# Patient Record
(demographics unavailable — no encounter records)

---

## 2018-03-08 NOTE — PDOC.LDHP
Labor and Delivery H&P


Chief complaint: abdominal pain


HPI: 





30 yo G1 at 39.2 by 7.2w US here with complaint of abdominal pain which onset 

this morning. She denies associated loss of fluid, vaginal bleeding, or 

decreased fetal movement. Her pregnancy course has been uncomplicated to this 

point. She has no significant medical history. She takes only PNV and iron. 


Current gestational age (weeks): 39 (39.2)


Due date: 18


Dating criteria: first trimester ultrasound (7.2w)


Grav: 1


Current pregnancy complications: none


Abnormal US findings: No


Current medications: pre- vitamins, iron


Social history: none





- Physical Exam


Vital signs reviewed and normal: yes


General: NAD, resting


Heart: other (Irregular rhythm with 3/6 systolic murmur heard best over mitral 

valve)


Lungs: CTAB


Abdomen: gravid


Extremeties: no edema


FHT: category 1 (Baseline 140), variability present


[place] contractions every: 6-7





- Vaginal Exam


cm dilated: 1


Effacement: 50%


Station: -3





- OB Labs


Blood type: O


RH: positive


Antibody Screen: negative


HIV: negative


RPR: negative


HEPSAg: negative


1 hour GCT: negative


GBS: negative


Rubella: immune





- Assessment





Term pregnancy, membranes intact, inconsistent contractions





- Plan


Plan: observation in L&D


-: 





Pt does not appear to be in labor. FHT are reassuring. Will monitor pt on L&D 

and recheck in 2 hours. Plan to discharge if she does not make significant 

change at recheck with instructions to return if she contracts q5 minutes for 

at least an hour, loses fluid or blood per vagina, or has decreased fetal 

movement. 





<João Marcos - Last Filed: 18 22:56>





<Anne Loo - Last Filed: 18 00:59>


Allergies/Adverse Reactions: 


 Allergies











Allergy/AdvReac Type Severity Reaction Status Date / Time


 


No Known Allergies Allergy   Verified 18 22:50














Attending Addendum





- Attending Addendum


Date/Time: 18 0056





I personally evaluated the patient and discussed the management with Dr. Marcos


I agree with the History, Examination, Assessment and Plan documented above 

with any addition or exceptions noted below- 30 yo  @39 2/7 weeks 

presented c/o ctx. Denies any VB, LOF (+)FM. Denies HA, visual change. Afebrile 

VSS SVE 1/50%/-2. Category 1 FHTs. [place] ctx q6-7 min.  Patient sent to ambulate 

for 2 hours. If no cervical change, will d/c home with labor precautions. 








<Anne Loo - Last Filed: 18 00:59>

## 2018-03-10 NOTE — PDOC.LDHP
Labor and Delivery H&P


Chief complaint: contractions


HPI: 





28 yo G1 at 39.4 by 7.2 week sono here with complaint of back pain associated 

with contractions. The pain and contractions have been present for about 2 

days. She was seen here on 3/8 for similar symptoms. She states that the pain 

now is worse than before, however the frequency of the contractions is about 

the same. She denies loss of fluid or blood per vagina. She denies decreased 

fetal movement. Her pregnancy has been uncomplicated thus far. She is 

requesting medicine for pain control.


Current gestational age (weeks): 39 (39.4)


Due date: 18


Dating criteria: first trimester ultrasound


Grav: 1


Current pregnancy complications: none


Abnormal US findings: No


Current medications: pre-sophie vitamins


Allergies/Adverse Reactions: 


 Allergies











Allergy/AdvReac Type Severity Reaction Status Date / Time


 


No Known Allergies Allergy   Verified 03/10/18 04:32











Social history: none





- Physical Exam


Vital signs reviewed and normal: yes


General: breathing through contractions


Heart: RRR (Systolic murmur, unchanged from previous exam on 3/8)


Lungs: CTAB


Abdomen: gravid (Non tender)


Extremeties: no edema


FHT: category 1, variability present, absent or minimal variables


[place] contractions every: 8





- Vaginal Exam


cm dilated: 1


Effacement: 25%


Station: -3





- OB Labs


Blood type: O


RH: positive


Antibody Screen: negative


HIV: negative


RPR: negative


HEPSAg: negative


1 hour GCT: negative


GBS: negative


Urine drug screen: negative


Rubella: immune





- Assessment





Term pregnancy in latent labor





- Plan


Plan: observation in L&D


-: 





Observe in L&D and give meds for pain control. Will also discuss other methods 

of pain control such as hot bath and resting on all fours as this may be 

related to fetal positioning. Recheck in 2 hours, send home if pain is 

controlled at that time.

## 2018-03-11 NOTE — PDOC.LDPN
Labor & Delivery Progress Note





- Subjective


Subjective: comfortable (s/p epidural)





- Objective


Vital signs reviewed and normal: yes


General: NAD, resting


SVE: per RN: 5.5/100/-1


FHT: category 1, variability present


[place] contractions every: 4-6 min


AROM: clear fluid





- Assessment


(1) Term pregnancy


Code(s): Z34.80 - ENCOUNTER FOR SUPRVSN OF NORMAL PREGNANCY, UNSP TRIMESTER   

Current Visit: Yes   Status: Acute   





(2) Active labor


Code(s): UOG8446 -    Current Visit: Yes   Status: Acute   


Plan: continue plan of care

## 2018-03-11 NOTE — PDOC.LDPN
Labor & Delivery Progress Note





- Subjective


Subjective: comfortable





- Objective


Abnormal vital signs: Temp to 100.0, went down to 99 without any intervention, 

Normal BP


General: NAD


Uterine fundus: non tender


SVE: 7/100/0 by Dr. Luque


Dilation: 7


Effacement: 100%


Station: 0


FHT: category 1, early decelerations, variability present


[place] contractions every: 5-6 minutes


Other exam findings: Fetal head position LOT


Procedures: IUPC placed


IUPC placed: yes


Resuscitative measures: maternal position change





- Assessment


(1) Active labor


Code(s): TIA6777 -    Current Visit: Yes   Status: Acute   





(2) Term pregnancy


Code(s): Z34.80 - ENCOUNTER FOR SUPRVSN OF NORMAL PREGNANCY, UNSP TRIMESTER   

Current Visit: Yes   Status: Acute   


Plan: continue plan of care, pitocin for augmentation


-: 


G1 @ 39.5 WGA by 7.2 wk US here in active labor


SVE 7/100/0


Cat 1 FHT


Epidural in place for pain control


-IUPC placed, inadequate contractions at this time


-Pit for labor augmentation


-Continue to monitor closely

## 2018-03-11 NOTE — PDOC.LDPN
Labor & Delivery Progress Note





- Subjective


Subjective: comfortable





- Objective


Vital signs reviewed and normal: yes


General: NAD


Uterine fundus: non tender


SVE: @ 1830 by Dr. Trujillo


Dilation: 9


Effacement: 100%


Station: 0


FHT: category 1, early decelerations, variability present


[place] contractions every: 4-5 minutes





- Assessment


(1) Active labor


Code(s): WLS9179 -    Current Visit: Yes   Status: Acute   





(2) Term pregnancy


Code(s): Z34.80 - ENCOUNTER FOR SUPRVSN OF NORMAL PREGNANCY, UNSP TRIMESTER   

Current Visit: Yes   Status: Acute   


Plan: continue plan of care


-: 


G1 @ 39.5 WGA by 7.2 wk US here in active labor


SVE 9/100/0


Cat 1 FHT


Epidural in place for pain control


-Continue routine care








<Deandra Trujillo - Last Filed: 03/11/18 18:42>





Attending Addendum





- Attending Addendum


Date/Time: 03/11/18 2027





I personally evaluated the patient and discussed the management with Dr. Trujillo. 


I agree with the History, Examination, Assessment and Plan documented above 

with any addition or exceptions noted below.





Dr. Luque's continuity, who will be assuming care.  I am in house if necessary.








<Collin Simpson - Last Filed: 03/11/18 20:27>

## 2018-03-11 NOTE — PDOC.LDPN
Labor & Delivery Progress Note





- Subjective


Subjective: comfortable, no concerns





- Objective


Vital signs reviewed and normal: yes


General: NAD, resting


Uterine fundus: non tender


SVE: 7/100/0 per RN


FHT: category 1, early decelerations, variability present


[place] contractions every: 2-5 min


FSE placed: yes (per RN)





- Assessment


(1) Term pregnancy


Code(s): Z34.80 - ENCOUNTER FOR SUPRVSN OF NORMAL PREGNANCY, UNSP TRIMESTER   

Current Visit: Yes   Status: Acute   





(2) Active labor


Code(s): GZU3277 -    Current Visit: Yes   Status: Acute   


Plan: continue plan of care

## 2018-03-11 NOTE — PDOC.LDHP
Labor and Delivery H&P


Chief complaint: contractions, loss of fluid (approx 3 am)


HPI: 


28yo G1 at 39.5wk by 7.2wk US who presents after LOF (trickling) which began at 

approx 3 am today. She also has been c/o regular ctx since 2 days PTA, but they 

have increased in strength, intensity and frequency after the LOF. 


No VB. Feeling nml FM. 


Current gestational age (weeks): 39 (39.5)


Due date: 18


Dating criteria: first trimester ultrasound (7.2wk)


Grav: 1


Para: 0


OB History Details: 


BV- treated


Current pregnancy complications: none


Abnormal US findings: No


Past Medical History: 


none


Current medications: pre-sophie vitamins


Previous surgical history: none


Social history: none





- Physical Exam


Vital signs reviewed and normal: yes


General: NAD, resting, breathing through contractions (painful contractions)


Heart: RRR


Lungs: CTAB


Abdomen: gravid


Extremeties: no edema


FHT: category 1


[place] contractions every: 4-6 minutes, strong 120-160sec contractions





- Vaginal Exam


cm dilated: 3 (3.5 per RN)


Effacement: 90%


Station: -1





- OB Labs


Blood type: O


RH: positive


Antibody Screen: negative


HIV: negative


RPR: negative


HEPSAg: negative


1 hour GCT: negative


GBS: negative


Urine drug screen: not done


Rubella: immune


Additional Labs: 


Amnisure (+)





- Assessment


L&D Assessment: term rupture in membranes





- Plan


Plan: admit to L&D, labor augmentation if indicated, informed consent obtained, 

anesthesia consult for pain management





<Deandra Mello - Last Filed: 18 10:38>





<Kimberlyn Baker - Last Filed: 18 10:54>


Allergies/Adverse Reactions: 


 Allergies











Allergy/AdvReac Type Severity Reaction Status Date / Time


 


No Known Allergies Allergy   Verified 03/10/18 04:32














Attending Addendum





- Attending Addendum


Date/Time: 18 1053





I personally evaluated the patient and discussed the management with Dr. Mello 

on 3/11/18.


I agree with the History, Examination, Assessment and Plan documented above 

with any addition or exceptions noted below.


Patient is a G1 at 39.5 with SROM and spontaneous labor.  Getting her epidural 

now.  GBS negative.  Routine, uncomplicated pregnancy.  Expectant management.





<Kimberlyn Baker - Last Filed: 18 10:54>

## 2018-03-11 NOTE — PDOC.LDPN
Labor & Delivery Progress Note





- Subjective


Subjective: comfortable





- Objective


Vital signs reviewed and normal: yes


General: NAD, resting


Uterine fundus: non tender


SVE: 9/100/0, FSE removed (not working)


FHT: category 1, category 2 (cat 1-2 (alternating with mod to min variability) )

, early decelerations


[place] contractions every: 2-5 min


Other exam findings: FSE bent in place, removed and external toco restarted





- Assessment


(1) Term pregnancy


Code(s): Z34.80 - ENCOUNTER FOR SUPRVSN OF NORMAL PREGNANCY, UNSP TRIMESTER   

Current Visit: Yes   Status: Acute   





(2) Active labor


Code(s): UFP9448 -    Current Visit: Yes   Status: Acute   


Plan: continue plan of care

## 2018-03-12 NOTE — PDOC.OPDEL
OB Operative/Delivery Note


Delivery Dr/Surgeon: Deandra Trujillo MD, Glen Luque MD, Marilu Klein DO


Pre-Delivery Diagnosis: active labor, ruptured membrane


Procedure/Post Delivery Dx: operative vaginal delivery


Weeks gestation: 39 (6 days)


Anesthesia: epidural (also had a pudendal block prior to vacuum delivery and 

local lidocaine for laceration repair)





- Findings


  ** A


Sex: female (viable)


Apgar - 1 min: 8


Apgar - 5 min: 9





- Additional Findings/Plan


Placenta delivered: spontaneous (intact, 3 vessel cord noted)


Repaired Obstetrical Laceration: 2nd degree (midline episiotomy was cut during 

vacuum assisted delivery and the 2nd degree lac was repaired with 3-0 vicryl)


Estimated blood loss: 250 mL


Compilations/Other Findings: 


vacuum assisted delivery at 0537 on 3/12 due to prolonged second stage of 

labor. 1 involuntary release of the vacuum, 2 minutes of application time, 

delivery achieved during one contraction, an episiotomy was cut during the 

delivery of the vacuum. 


After the jaw was in view the vacuum was released and the anterior shoulder and 

then the rest of the  was delivered in the routine fashion. There was no 

nuchal cord. The mouth and nares were bulb suctioned. 


Post delivery plan: routine recovery

## 2018-03-12 NOTE — PDOC.LDPN
Labor & Delivery Progress Note





- Subjective


Subjective: painful contractions, vaginal pressure





- Objective


Vital signs reviewed and normal: yes


General: breathing through contractions


Uterine fundus: palpable contractions


SVE: @ 0205 by Dr. Trujillo


Dilation: anterior lip


Effacement: 100%


Station: 1+


FHT: category 2, late decelerations (4 late decels that resolved with position 

change), variability present


[place] contractions every: 2-5 minutes





- Assessment


(1) Active labor


Code(s): [id number] -    Current Visit: Yes   Status: Acute   





(2) Term pregnancy


Code(s): Z34.80 - ENCOUNTER FOR SUPRVSN OF NORMAL PREGNANCY, UNSP TRIMESTER   

Current Visit: Yes   Status: Acute   


Plan: continue plan of care, pitocin for augmentation


-: 


G1 @ 39.6 WGA by 7.2 wk US here in active labor


SVE 9.5/100/+1


Cat 1 FHT


Epidural in place for pain control


IUPC in place, ctx have been adequate for the past 2.5 hours


-Pit for labor augmentation, currently at 6


-Continue to monitor closely


-Will recheck in 30 minutes-1 hour


-maternal position changes for late decels

## 2018-03-12 NOTE — PDOC.LDPN
Labor & Delivery Progress Note





- Subjective


Subjective: painful contractions





- Objective


Vital signs reviewed and normal: yes


General: NAD


Uterine fundus: non tender


SVE: @ 0030 by nurse


Dilation: 8


Effacement: 100%


Station: 1+


FHT: category 1, early decelerations, variability present


[place] contractions every: 2-3 minutes, adequate ctx for 1 hour on pit at 6





- Assessment


(1) Active labor


Code(s): BIG8130 -    Current Visit: Yes   Status: Acute   





(2) Term pregnancy


Code(s): Z34.80 - ENCOUNTER FOR SUPRVSN OF NORMAL PREGNANCY, UNSP TRIMESTER   

Current Visit: Yes   Status: Acute   


Plan: continue plan of care, pitocin for augmentation


-: 


G1 @ 39.6 WGA by 7.2 wk US here in active labor


SVE 8/100/+1


Cat 1 FHT


Epidural in place for pain control


IUPC in place, ctx have been adequate for the past hour


-Pit for labor augmentation


-Continue to monitor closely


-Will recheck in 2 hours

## 2018-03-13 NOTE — PDOC.PP
Post Partum Progress Note


Post Partum Day #: 1


Subjective: 





Pt doing well this morning. Denies any headaches or dizziness. Denies any fever/

chills. Denies any SOB. Denies any acute events overnight. Pt does report 

having some pain in her ankles and feet bilaterally. No redness or swelling 

noted. 


PO intake tolerated: yes


Flatus: yes


Ambulation: yes (She has not been up walking much per reports of nurse)


 Vital Signs (12 hours)











  Temp Pulse Resp BP BP Pulse Ox


 


 18 05:15  97.6 F  71  20  98/56 L  


 


 18 00:00  98.4 F  72  18  92/54 L  


 


 18 19:45  98.6 F  86  18   113/50 L  98








 Weight











Weight                         59.874 kg

















- Physical Examination


General: NAD


Cardiovascular: no m/r/g, RRR


Respiratory: clear to auscultation bilaterally, non-labored breathing


Abdominal: + bowel sounds, lochia, no distention, appropriately TTP


Fundus firm & at: umbilicus


Deviation from normal: homans positive, no redness or swelling noted


Skin: no rash


Neurological: no gross focal deficits


Psychiatric: A&Ox3, normal affect


Result Diagrams: 


 18 11:52





Additional Labs: 


 Post Partum Labs











Hep Bs Antigen  Non-Reactive S/CO (NonReactive)   18  10:17    











(1) Term pregnancy delivered


Code(s): O80 - ENCOUNTER FOR FULL-TERM UNCOMPLICATED DELIVERY   Status: Acute   





(2) Premature rupture of membranes


Code(s): O42.90 - JUAN ROM, 7TH0 BETW RUPT & ONST LABR, UNSP WEEKS OF GEST   

Status: Acute   





- Assessment/Plan





30 yo  delivered a viable TAGA Female infant via vacuum assisted vaginal 

delivery. 2nd degree lac was noted and repaired in usual fashion. Mom had 

premature rupture of membranes and time from rupture to delivery was 26 hours. 


-Routine Post-partum care 


-Pt doing well. Advise to ambulate often. 


-Pumping at this time.


-No sign of fevers or infection 


-Pt dizzy and having headaches yesterday. Vital signs stable and repeat post 

hemagram did not show significant drop. Advised mom to drink plenty of fluids. 

If continues to get dizzy may bolus with fluid and start iron. 





Navarro's sign positive. Having bilateral ankle and feet pain. No swelling or 

redness noted. No increase in heat. 


-Pt has not been as ambulatory per nursing. Advised pt to get up and move 

around. 


-Will continue to monitor. If gets red or worse unilaterally may consider U/S











<Amanuel Estrada - Last Filed: 18 10:00>


 Vital Signs (12 hours)











  Temp Pulse Resp BP


 


 18 07:50  98.1 F  71  16  98/55 L


 


 18 05:15  97.6 F  71  20  98/56 L


 


 18 00:00  98.4 F  72  18  92/54 L








 Weight











Weight                         59.874 kg














Result Diagrams: 


 18 11:52





Additional Labs: 


 Post Partum Labs











Hep Bs Antigen  Non-Reactive S/CO (NonReactive)   18  10:17    














<Barbara Wood - Last Filed: 18 11:56>





Attending Addendum





- Attending Addendum


Date/Time: 18 1152





I personally evaluated the patient and discussed the management with Rupal Estrada and Riaz. 


I agree with the History, Examination, Assessment and Plan documented above 

with any addition or exceptions noted below.


PPD # 1 s/p VAVD


Doing well. 2+ankle edema without calf tenderness, redness or warmth. Negative 

Navarro's sign on my exam. 


Continue routine postpartum care








<Barbara Wood - Last Filed: 18 11:56>

## 2018-03-14 NOTE — PDOC.PP
Post Partum Progress Note


Post Partum Day #: 2


Subjective: 





Pt doing well. Reports having runny nose and sore throat this morning. Reports 

foot and ankle pain doing better. Reports having light lochia. Denies any fever 

or chills. Denies any acute events overnight. 


No other concerns at this time


PO intake tolerated: yes


Flatus: yes


Ambulation: yes


 Vital Signs (12 hours)











  Temp Pulse Resp BP


 


 18 20:15  98.6 F  61  20  119/62








 Weight











Weight                         59.874 kg

















- Physical Examination


General: NAD


Cardiovascular: no m/r/g, RRR


Respiratory: clear to auscultation bilaterally, non-labored breathing


Abdominal: + bowel sounds, no distention, appropriately TTP


Fundus firm & at: 2 fingers below umbilicus


Extremities: negative homans (B)


Skin: no rash


Neurological: no gross focal deficits


Psychiatric: A&Ox3, normal affect


Result Diagrams: 


 18 11:52





Additional Labs: 


 Post Partum Labs











Hep Bs Antigen  Non-Reactive S/CO (NonReactive)   18  10:17    











(1) Term pregnancy delivered


Code(s): O80 - ENCOUNTER FOR FULL-TERM UNCOMPLICATED DELIVERY   Status: Acute   





(2) Premature rupture of membranes


Code(s): O42.90 - JUAN ROM, 7TH0 BETW RUPT & ONST LABR, UNSP WEEKS OF GEST   

Status: Resolved   





- Assessment/Plan





30 yo  delivered a viable TAGA Female infant via vacuum assisted vaginal 

delivery. 2nd degree lac was noted and repaired in usual fashion. Mom had 

prolonged  rupture of membranes and time from rupture to delivery was 26 hours. 


-Routine Post-partum care 


-Pt doing well. Advise to ambulate often. 


-Pumping at this time. Lactation consult oredered


-No sign of fevers or infection 


-Pt reports having runny nose and a little sore throat this morning. Will try 

some zyrtec for sx's at this time. 


-Pt dizzy and having headaches Monday. Vital signs stable and repeat post 

hemagram did not show significant drop. Advised mom to drink plenty of fluids. 

If continues to get dizzy may bolus with fluid and start iron. 








Pt having trace edema in both legs bilaterally. R. leg a little more swollen 

than the left. 


-Pt reports pain doing better in feet and ankles from before. 


-Will continue to monitor. If gets red or worse unilaterally may consider U/S





<Amanuel Estrada - Last Filed: 18 06:57>


 Vital Signs (12 hours)











  Temp Pulse Resp


 


 18 08:00  98.6 F  61  20








 Weight











Weight                         59.874 kg














Result Diagrams: 


 18 11:52





Additional Labs: 


 Post Partum Labs











Hep Bs Antigen  Non-Reactive S/CO (NonReactive)   18  10:17    














<Barbara Wood - Last Filed: 18 11:35>





Attending Addendum





- Attending Addendum


Date/Time: 18 1133





I personally evaluated the patient and discussed the management with Rupal Estrada and Riaz.


I agree with the History, Examination, Assessment and Plan documented above 

with any addition or exceptions noted below.








<Barbara Wood - Last Filed: 18 11:35>